# Patient Record
Sex: FEMALE | Race: WHITE
[De-identification: names, ages, dates, MRNs, and addresses within clinical notes are randomized per-mention and may not be internally consistent; named-entity substitution may affect disease eponyms.]

---

## 2018-06-01 ENCOUNTER — HOSPITAL ENCOUNTER (OUTPATIENT)
Dept: HOSPITAL 17 - HROP | Age: 73
Discharge: HOME | End: 2018-06-01
Payer: COMMERCIAL

## 2018-06-01 VITALS
HEART RATE: 77 BPM | DIASTOLIC BLOOD PRESSURE: 87 MMHG | SYSTOLIC BLOOD PRESSURE: 174 MMHG | RESPIRATION RATE: 20 BRPM | TEMPERATURE: 98.7 F | OXYGEN SATURATION: 96 %

## 2018-06-01 VITALS
TEMPERATURE: 98 F | RESPIRATION RATE: 18 BRPM | DIASTOLIC BLOOD PRESSURE: 69 MMHG | SYSTOLIC BLOOD PRESSURE: 160 MMHG | OXYGEN SATURATION: 97 % | HEART RATE: 88 BPM

## 2018-06-01 VITALS
SYSTOLIC BLOOD PRESSURE: 144 MMHG | DIASTOLIC BLOOD PRESSURE: 68 MMHG | HEART RATE: 85 BPM | RESPIRATION RATE: 18 BRPM | OXYGEN SATURATION: 95 %

## 2018-06-01 DIAGNOSIS — G62.9: ICD-10-CM

## 2018-06-01 DIAGNOSIS — K21.9: ICD-10-CM

## 2018-06-01 DIAGNOSIS — I10: ICD-10-CM

## 2018-06-01 DIAGNOSIS — Z13.828: Primary | ICD-10-CM

## 2018-06-01 DIAGNOSIS — G47.00: ICD-10-CM

## 2018-06-01 DIAGNOSIS — F41.9: ICD-10-CM

## 2018-06-01 DIAGNOSIS — E78.00: ICD-10-CM

## 2018-06-01 DIAGNOSIS — F32.9: ICD-10-CM

## 2018-06-01 LAB
BASOPHILS # BLD AUTO: 0.1 TH/MM3 (ref 0–0.2)
BASOPHILS NFR BLD: 1.2 % (ref 0–2)
COLOR CSF: CLEAR
EOSINOPHIL # BLD: 0.1 TH/MM3 (ref 0–0.4)
EOSINOPHIL NFR BLD: 2.1 % (ref 0–4)
ERYTHROCYTE [DISTWIDTH] IN BLOOD BY AUTOMATED COUNT: 14 % (ref 11.6–17.2)
HCT VFR BLD CALC: 39.3 % (ref 35–46)
HGB BLD-MCNC: 13 GM/DL (ref 11.6–15.3)
INR PPP: 1 RATIO
LYMPHOCYTES # BLD AUTO: 1.8 TH/MM3 (ref 1–4.8)
LYMPHOCYTES NFR BLD AUTO: 28.7 % (ref 9–44)
LYMPHOCYTES NFR CSF MANUAL: 50 %
MCH RBC QN AUTO: 30.3 PG (ref 27–34)
MCHC RBC AUTO-ENTMCNC: 33 % (ref 32–36)
MCV RBC AUTO: 91.8 FL (ref 80–100)
MONOCYTE #: 0.8 TH/MM3 (ref 0–0.9)
MONOCYTES NFR BLD: 13.2 % (ref 0–8)
NEUTROPHILS # BLD AUTO: 3.4 TH/MM3 (ref 1.8–7.7)
NEUTROPHILS NFR BLD AUTO: 54.8 % (ref 16–70)
NEUTROPHILS NFR CSF: 50 %
PLATELET # BLD: 328 TH/MM3 (ref 150–450)
PMV BLD AUTO: 8 FL (ref 7–11)
PROTHROMBIN TIME: 9.8 SEC (ref 9.8–11.6)
RBC # BLD AUTO: 4.28 MIL/MM3 (ref 4–5.3)
RBC TUBE #4: 127 /MM3
SPECIMEN VOL CSF: 3 ML
TOTAL PROTEIN,CSF: 54.9 MG/DL (ref 15–45)
WBC # BLD AUTO: 6.2 TH/MM3 (ref 4–11)
WBC TUBE4 # CSF: 8 /MM3 (ref 0–10)

## 2018-06-01 PROCEDURE — 83873 ASSAY OF CSF PROTEIN: CPT

## 2018-06-01 PROCEDURE — 83916 OLIGOCLONAL BANDS: CPT

## 2018-06-01 PROCEDURE — 86403 PARTICLE AGGLUT ANTBDY SCRN: CPT

## 2018-06-01 PROCEDURE — 82042 OTHER SOURCE ALBUMIN QUAN EA: CPT

## 2018-06-01 PROCEDURE — 87015 SPECIMEN INFECT AGNT CONCNTJ: CPT

## 2018-06-01 PROCEDURE — 87205 SMEAR GRAM STAIN: CPT

## 2018-06-01 PROCEDURE — 86335 IMMUNFIX E-PHORSIS/URINE/CSF: CPT

## 2018-06-01 PROCEDURE — 82040 ASSAY OF SERUM ALBUMIN: CPT

## 2018-06-01 PROCEDURE — 77003 FLUOROGUIDE FOR SPINE INJECT: CPT

## 2018-06-01 PROCEDURE — 84157 ASSAY OF PROTEIN OTHER: CPT

## 2018-06-01 PROCEDURE — 85610 PROTHROMBIN TIME: CPT

## 2018-06-01 PROCEDURE — 62270 DX LMBR SPI PNXR: CPT

## 2018-06-01 PROCEDURE — 83883 ASSAY NEPHELOMETRY NOT SPEC: CPT

## 2018-06-01 PROCEDURE — 85730 THROMBOPLASTIN TIME PARTIAL: CPT

## 2018-06-01 PROCEDURE — 87206 SMEAR FLUORESCENT/ACID STAI: CPT

## 2018-06-01 PROCEDURE — 85025 COMPLETE CBC W/AUTO DIFF WBC: CPT

## 2018-06-01 PROCEDURE — 86592 SYPHILIS TEST NON-TREP QUAL: CPT

## 2018-06-01 PROCEDURE — 84165 PROTEIN E-PHORESIS SERUM: CPT

## 2018-06-01 PROCEDURE — 88108 CYTOPATH CONCENTRATE TECH: CPT

## 2018-06-01 PROCEDURE — 87116 MYCOBACTERIA CULTURE: CPT

## 2018-06-01 PROCEDURE — 86618 LYME DISEASE ANTIBODY: CPT

## 2018-06-01 PROCEDURE — 89051 BODY FLUID CELL COUNT: CPT

## 2018-06-01 PROCEDURE — 82784 ASSAY IGA/IGD/IGG/IGM EACH: CPT

## 2018-06-01 PROCEDURE — 87070 CULTURE OTHR SPECIMN AEROBIC: CPT

## 2018-06-01 PROCEDURE — 87102 FUNGUS ISOLATION CULTURE: CPT

## 2018-06-01 PROCEDURE — 86334 IMMUNOFIX E-PHORESIS SERUM: CPT

## 2018-06-01 PROCEDURE — 82945 GLUCOSE OTHER FLUID: CPT

## 2018-06-01 NOTE — PD.RAD
Post Procedure Progress Note


Procedure Date:


Jun 1, 2018


Supervising Radiologist:


Terrence Lancaster


Proceduralist/Assist:  Avel Javed, RT(R), Other


Anesthesia:  Conscious Sedation


Plan of Activity


Patient to Unit:  [facility]


Patient Condition:  Good


See PACS Report for procedural detail/treatment











Terrence Lancaster MD Jun 1, 2018 09:17

## 2018-06-01 NOTE — RADRPT
EXAM DATE:  6/1/2018 9:44 AM EDT

AGE/SEX:        72 years / Female



INDICATIONS:  Patient in need of lumbar puncture to evaluate for multiple sclerosis.



CLINICAL DATA:  This is the patient's initial encounter. Patient reports that signs and symptoms have
 been present for > 1 year and indicates a pain score of 0/10. 



MEDICAL/SURGICAL HISTORY:       Hypertension.  Gastroesophageal reflux disease. High cholesterol.Migr
aines.HSV.Cervicalgia.Anxiety.Peripheral neuropathy.Depression.Insomnia.  Appendectomy.  Cholecystect
real. RT Oophorectomy.Partial hysterectomy.RT foot ORIF.



COMPARISON:      No prior [facility] exams available for comparison.



FLUORO TIME (min):     0.185

IMAGE SERIES:  1

ACCESS SITE:  [id number]       



LUMBAR PUNCTURE TIME:  0912 hours







FLUID: Total volume of 13 cc of Clear fluid was removed. Fluid was sent to lab for ordered studies. 











 

. .



PROCEDURE:

1.  Fluoroscopic guided lumbar puncture.



The risks, benefits and alternatives to the procedure were explained and verbal and written consent w
as obtained.  The site was prepped in sterile fashion.  Full sterile technique was used, including ca
p, mask, sterile gloves and gown and a large sterile sheet.  Hand hygiene and 2% chlorhexidine and/or
 betadine/alcohol prep was utilized per protocol for cutaneous antisepsis.  The skin and subcutaneous
 tissues were infiltrated with local anesthetic solution.



With fluoroscopic guidance the lumbar thecal sac was punctured at the level above.  The fluid describ
ed above was removed without difficulty.



The patient tolerated the procedure well and there were no complications.



CONCLUSION:  

1.  Uncomplicated fluoroscopically guided lumbar puncture.



Electronically signed by: Terrence Lancaster MD  6/1/2018 10:10 AM EDT

## 2018-06-02 LAB
IGA SERPL-MCNC: 257 MG/DL (ref 90–497)
IGG SERPL-MCNC: 826 MG/DL (ref 650–1610)
IGM SERPL-MCNC: 77 MG/DL (ref 42–255)
KAPPA LC/LAMBDA SER: 1.5 {RATIO} (ref 1.57–3.93)
KAPPA LIGHT CHAIN: 213 MG/DL (ref 170–370)
LAMBDA LC SERPL-MCNC: 142 MG/DL (ref 90–210)

## 2018-06-04 LAB
ALBUMIN CSF-MCNC: 34.9 MG/DL (ref ?–27)
ALBUMIN SERPL-MCNC: 4030 MG/DL (ref 3200–4800)
DEPRECATED C NEOFORM AG CSF QL: (no result)
IGG CSF-MCNC: 3 MG/DL (ref ?–8.1)
IGG SERPL-MCNC: 815 MG/DL (ref 767–1590)
IGG SYNTH RATE SER+CSF CALC-MRATE: 0 MG/24 H (ref ?–12)
IGG/ALB CLEAR SER+CSF-RTO: 0.45 (ref ?–0.85)
IGG/ALB CSF: 0.09 {RATIO} (ref ?–0.21)
IGG/ALB SER: 0.2 {RATIO} (ref ?–0.4)
OLIGOCLONAL BANDS CSF ELPH-IMP: 0 BANDS (ref ?–4)
OLIGOCLONAL BANDS CSF ELPH-IMP: 4 BANDS
OLIGOCLONAL BANDS SERPL: 4 BANDS

## 2018-06-05 LAB — ALB/GLOB RATIO (SPE): 1.51 (ref 1.39–2.23)
